# Patient Record
Sex: FEMALE | Race: WHITE | NOT HISPANIC OR LATINO | Employment: FULL TIME | URBAN - METROPOLITAN AREA
[De-identification: names, ages, dates, MRNs, and addresses within clinical notes are randomized per-mention and may not be internally consistent; named-entity substitution may affect disease eponyms.]

---

## 2018-02-06 ENCOUNTER — HOSPITAL ENCOUNTER (EMERGENCY)
Facility: HOSPITAL | Age: 58
Discharge: HOME | End: 2018-02-06
Attending: EMERGENCY MEDICINE

## 2018-02-06 VITALS
HEART RATE: 92 BPM | SYSTOLIC BLOOD PRESSURE: 112 MMHG | RESPIRATION RATE: 20 BRPM | WEIGHT: 150 LBS | HEIGHT: 63 IN | DIASTOLIC BLOOD PRESSURE: 73 MMHG | TEMPERATURE: 99 F | OXYGEN SATURATION: 96 % | BODY MASS INDEX: 26.58 KG/M2

## 2018-02-06 DIAGNOSIS — R05.9 COUGH: ICD-10-CM

## 2018-02-06 DIAGNOSIS — J06.9 VIRAL URI WITH COUGH: Primary | ICD-10-CM

## 2018-02-06 LAB
DEPRECATED S PYO AG THROAT QL EIA: NEGATIVE
FLUAV AG SPEC QL IA: NEGATIVE
FLUBV AG SPEC QL IA: NEGATIVE
SPECIMEN SOURCE: NORMAL

## 2018-02-06 PROCEDURE — 87400 INFLUENZA A/B EACH AG IA: CPT | Mod: 59

## 2018-02-06 PROCEDURE — 99284 EMERGENCY DEPT VISIT MOD MDM: CPT

## 2018-02-06 PROCEDURE — 87880 STREP A ASSAY W/OPTIC: CPT

## 2018-02-06 PROCEDURE — 87081 CULTURE SCREEN ONLY: CPT

## 2018-02-06 RX ORDER — LEVOTHYROXINE SODIUM 50 UG/1
50 TABLET ORAL DAILY
COMMUNITY

## 2018-02-06 RX ORDER — GUAIFENESIN/DEXTROMETHORPHAN 100-10MG/5
5 SYRUP ORAL 4 TIMES DAILY PRN
Qty: 120 ML | Refills: 0 | Status: SHIPPED | OUTPATIENT
Start: 2018-02-06 | End: 2018-02-16

## 2018-02-06 RX ORDER — FLUTICASONE PROPIONATE 50 MCG
1 SPRAY, SUSPENSION (ML) NASAL 2 TIMES DAILY PRN
Qty: 15 G | Refills: 0 | Status: SHIPPED | OUTPATIENT
Start: 2018-02-06

## 2018-02-06 RX ORDER — CETIRIZINE HYDROCHLORIDE 10 MG/1
10 TABLET ORAL DAILY
Qty: 30 TABLET | Refills: 0 | Status: SHIPPED | OUTPATIENT
Start: 2018-02-06 | End: 2023-08-30

## 2018-02-06 RX ORDER — BENZONATATE 100 MG/1
100 CAPSULE ORAL 3 TIMES DAILY PRN
Qty: 20 CAPSULE | Refills: 0 | Status: SHIPPED | OUTPATIENT
Start: 2018-02-06 | End: 2018-02-16

## 2018-02-06 RX ORDER — HYDROCODONE BITARTRATE AND ACETAMINOPHEN 10; 325 MG/1; MG/1
TABLET ORAL
COMMUNITY
End: 2023-08-23

## 2018-02-06 NOTE — ED NOTES
"C/o fever chills sore throat diarrhea and moist cough for the past "few" days alert calm even non labored respirations. Aware to notify nurse of needs or concerns.  "

## 2018-02-06 NOTE — ED PROVIDER NOTES
Encounter Date: 2/6/2018       History     Chief Complaint   Patient presents with    Cough     x 3 days     Sore Throat     Patient is a 57 year old female who presents with flu like symptoms for four days. She denied PMH. She states the symptoms started with a sore throat and cough. She reports worsening cough that is now productive. Associated body aches, chills, nausea and diarrhea. Recent sick contact with family members. She states she has been taking Nyquil at home with no improvement. She denied fever, vomiting, rash, chest pain or shortness of breath.      The history is provided by the patient.     Review of patient's allergies indicates:   Allergen Reactions    Sulfa (sulfonamide antibiotics)      History reviewed. No pertinent past medical history.  No past surgical history on file.  No family history on file.  Social History   Substance Use Topics    Smoking status: Not on file    Smokeless tobacco: Not on file    Alcohol use Not on file     Review of Systems   Constitutional: Positive for chills. Negative for activity change, appetite change and fever.   HENT: Positive for sore throat. Negative for congestion and rhinorrhea.    Eyes: Negative for redness and visual disturbance.   Respiratory: Positive for cough. Negative for chest tightness and shortness of breath.    Cardiovascular: Negative for chest pain.   Gastrointestinal: Positive for diarrhea and nausea. Negative for abdominal pain and vomiting.   Genitourinary: Negative for dysuria and frequency.   Musculoskeletal: Negative for back pain, neck pain and neck stiffness.   Skin: Negative for rash.   Neurological: Negative for dizziness, syncope, numbness and headaches.       Physical Exam     Initial Vitals [02/06/18 0840]   BP Pulse Resp Temp SpO2   112/73 92 20 98.9 °F (37.2 °C) 96 %      MAP       86         Physical Exam    Constitutional: Vital signs are normal. She appears well-developed and well-nourished. She is cooperative.   Non-toxic appearance. She does not have a sickly appearance.   HENT:   Head: Normocephalic and atraumatic.   Right Ear: External ear normal.   Left Ear: External ear normal.   Nose: Nose normal.   Mouth/Throat: Oropharynx is clear and moist.   Eyes: Conjunctivae and lids are normal. Pupils are equal, round, and reactive to light.   Neck: Normal range of motion and full passive range of motion without pain. Neck supple.   Cardiovascular: Normal rate and regular rhythm.   Pulmonary/Chest: Breath sounds normal. She has no wheezes. She has no rales.   Abdominal: Soft. Normal appearance. There is no tenderness. There is no rigidity, no rebound and no guarding.   Neurological: She is alert and oriented to person, place, and time.   Skin: Skin is warm, dry and intact. No rash noted.         ED Course   Procedures  Labs Reviewed   THROAT SCREEN, RAPID   CULTURE, STREP A,  THROAT   INFLUENZA A AND B ANTIGEN             Medical Decision Making:   History:   Old Medical Records: I decided to obtain old medical records.  Clinical Tests:   Lab Tests: Ordered and Reviewed  Radiological Study: Ordered and Reviewed       APC / Resident Notes:   This is an urgent evaluation of a 57 year old female who presents with flu-like symptoms.  Vital signs are stable.  Patient is afebrile.  Abdomen is soft and nontender.  There is no rebound, rigidity or distention.  I doubt intra-abdominal process.  Bilateral TMs with no erythema, retraction or perforation.  There is no mastoid tenderness.  There is no movement tenderness to bilateral ears.  No tonsillar swelling or exudate noted.  Uvula is midline.  No concern for ludwigs angina. Breath sounds are clear and equal bilaterally. Workup is negative.  Suspect symptoms are secondary to viral illness.  Symptomatic treatment. Discussed results with patient. Return precautions given. Patient is to follow up with their primary care provider. Case was discussed with Dr. Elizabeth who is in agreement with  the plan of care. All questions answered.                 ED Course      Clinical Impression:   The primary encounter diagnosis was Viral URI with cough. A diagnosis of Cough was also pertinent to this visit.                           Caprice Kimball PA-C  02/06/18 0556

## 2018-02-06 NOTE — DISCHARGE INSTRUCTIONS
Take medications as prescribed.  See your primary care provider in one week.  Rest. Drink plenty of fluids.  For worsening symptoms, chest pain, shortness of breath, increased abdominal pain, high grade fever, stroke or stroke like symptoms, immediately go to the nearest Emergency Room or call 911 as soon as possible.

## 2018-02-09 LAB — BACTERIA THROAT CULT: NORMAL

## 2023-08-23 PROBLEM — M53.3 SACROILIAC JOINT DYSFUNCTION OF LEFT SIDE: Status: ACTIVE | Noted: 2023-08-23

## 2023-08-23 PROBLEM — G89.4 CHRONIC PAIN DISORDER: Status: ACTIVE | Noted: 2023-08-23

## 2023-08-23 PROBLEM — M47.816 LUMBAR SPONDYLOSIS: Status: ACTIVE | Noted: 2023-08-23

## 2023-08-23 PROBLEM — M54.16 LUMBAR RADICULOPATHY: Status: ACTIVE | Noted: 2023-08-23

## 2024-08-19 ENCOUNTER — APPOINTMENT (RX ONLY)
Dept: URBAN - NONMETROPOLITAN AREA CLINIC 34 | Facility: CLINIC | Age: 64
Setting detail: DERMATOLOGY
End: 2024-08-19

## 2024-08-19 VITALS — HEIGHT: 63 IN | WEIGHT: 136 LBS

## 2024-08-19 DIAGNOSIS — L82.1 OTHER SEBORRHEIC KERATOSIS: ICD-10-CM | Status: INADEQUATELY CONTROLLED

## 2024-08-19 DIAGNOSIS — L82.0 INFLAMED SEBORRHEIC KERATOSIS: ICD-10-CM

## 2024-08-19 DIAGNOSIS — L57.8 OTHER SKIN CHANGES DUE TO CHRONIC EXPOSURE TO NONIONIZING RADIATION: ICD-10-CM | Status: STABLE

## 2024-08-19 DIAGNOSIS — L72.8 OTHER FOLLICULAR CYSTS OF THE SKIN AND SUBCUTANEOUS TISSUE: ICD-10-CM | Status: INADEQUATELY CONTROLLED

## 2024-08-19 PROCEDURE — ? LIQUID NITROGEN (COSMETIC)

## 2024-08-19 PROCEDURE — ? COUNSELING

## 2024-08-19 PROCEDURE — 99203 OFFICE O/P NEW LOW 30 MIN: CPT

## 2024-08-19 ASSESSMENT — LOCATION DETAILED DESCRIPTION DERM
LOCATION DETAILED: RIGHT SUPERIOR LATERAL UPPER BACK
LOCATION DETAILED: LEFT ANTERIOR PROXIMAL THIGH
LOCATION DETAILED: LEFT POSTERIOR SHOULDER
LOCATION DETAILED: RIGHT PROXIMAL DORSAL FOREARM

## 2024-08-19 ASSESSMENT — LOCATION ZONE DERM
LOCATION ZONE: LEG
LOCATION ZONE: ARM
LOCATION ZONE: TRUNK

## 2024-08-19 ASSESSMENT — LOCATION SIMPLE DESCRIPTION DERM
LOCATION SIMPLE: LEFT THIGH
LOCATION SIMPLE: RIGHT FOREARM
LOCATION SIMPLE: LEFT SHOULDER
LOCATION SIMPLE: RIGHT BACK